# Patient Record
Sex: FEMALE | URBAN - METROPOLITAN AREA
[De-identification: names, ages, dates, MRNs, and addresses within clinical notes are randomized per-mention and may not be internally consistent; named-entity substitution may affect disease eponyms.]

---

## 2018-06-22 ENCOUNTER — APPOINTMENT (RX ONLY)
Dept: URBAN - METROPOLITAN AREA CLINIC 156 | Facility: CLINIC | Age: 73
Setting detail: DERMATOLOGY
End: 2018-06-22

## 2018-06-22 DIAGNOSIS — L57.0 ACTINIC KERATOSIS: ICD-10-CM

## 2018-06-22 DIAGNOSIS — L81.4 OTHER MELANIN HYPERPIGMENTATION: ICD-10-CM

## 2018-06-22 DIAGNOSIS — L71.8 OTHER ROSACEA: ICD-10-CM

## 2018-06-22 DIAGNOSIS — Z71.89 OTHER SPECIFIED COUNSELING: ICD-10-CM

## 2018-06-22 DIAGNOSIS — D18.0 HEMANGIOMA: ICD-10-CM

## 2018-06-22 DIAGNOSIS — L82.1 OTHER SEBORRHEIC KERATOSIS: ICD-10-CM

## 2018-06-22 DIAGNOSIS — Z85.828 PERSONAL HISTORY OF OTHER MALIGNANT NEOPLASM OF SKIN: ICD-10-CM

## 2018-06-22 PROBLEM — D18.01 HEMANGIOMA OF SKIN AND SUBCUTANEOUS TISSUE: Status: ACTIVE | Noted: 2018-06-22

## 2018-06-22 PROCEDURE — 17000 DESTRUCT PREMALG LESION: CPT

## 2018-06-22 PROCEDURE — ? RECOMMENDATIONS

## 2018-06-22 PROCEDURE — ? COUNSELING

## 2018-06-22 PROCEDURE — ? LIQUID NITROGEN

## 2018-06-22 PROCEDURE — ? SUNSCREEN RECOMMENDATIONS

## 2018-06-22 PROCEDURE — 99202 OFFICE O/P NEW SF 15 MIN: CPT | Mod: 25

## 2018-06-22 ASSESSMENT — LOCATION SIMPLE DESCRIPTION DERM
LOCATION SIMPLE: LEFT FOREARM
LOCATION SIMPLE: LEFT CHEEK
LOCATION SIMPLE: RIGHT CHEEK
LOCATION SIMPLE: LEFT UPPER BACK
LOCATION SIMPLE: RIGHT FOREARM
LOCATION SIMPLE: NOSE

## 2018-06-22 ASSESSMENT — LOCATION DETAILED DESCRIPTION DERM
LOCATION DETAILED: RIGHT VENTRAL DISTAL FOREARM
LOCATION DETAILED: LEFT SUPERIOR MEDIAL UPPER BACK
LOCATION DETAILED: RIGHT INFERIOR LATERAL MALAR CHEEK
LOCATION DETAILED: LEFT INFERIOR CENTRAL MALAR CHEEK
LOCATION DETAILED: RIGHT INFERIOR CENTRAL MALAR CHEEK
LOCATION DETAILED: LEFT NASAL DORSUM
LOCATION DETAILED: LEFT VENTRAL DISTAL FOREARM

## 2018-06-22 ASSESSMENT — LOCATION ZONE DERM
LOCATION ZONE: ARM
LOCATION ZONE: FACE
LOCATION ZONE: NOSE
LOCATION ZONE: TRUNK

## 2018-06-22 NOTE — PROCEDURE: RECOMMENDATIONS
Recommendation Preamble: The following recommendations were made during the visit:
Recommendations (Free Text): Discussed IPL will precert
Detail Level: Zone

## 2018-06-27 ENCOUNTER — APPOINTMENT (RX ONLY)
Dept: URBAN - METROPOLITAN AREA CLINIC 154 | Facility: CLINIC | Age: 73
Setting detail: DERMATOLOGY
End: 2018-06-27

## 2018-06-27 DIAGNOSIS — L71.8 OTHER ROSACEA: ICD-10-CM

## 2018-06-27 PROCEDURE — 17107 DSTR CUT VSC PRLF LES10-50SQ: CPT | Mod: 79

## 2018-06-27 PROCEDURE — ? LASER VASCULAR LESION

## 2018-06-27 ASSESSMENT — LOCATION SIMPLE DESCRIPTION DERM
LOCATION SIMPLE: RIGHT CHEEK
LOCATION SIMPLE: LEFT CHEEK

## 2018-06-27 ASSESSMENT — LOCATION DETAILED DESCRIPTION DERM
LOCATION DETAILED: LEFT CENTRAL MALAR CHEEK
LOCATION DETAILED: RIGHT CENTRAL MALAR CHEEK

## 2018-06-27 ASSESSMENT — LOCATION ZONE DERM: LOCATION ZONE: FACE

## 2018-06-27 NOTE — PROCEDURE: LASER VASCULAR LESION
Was Feathering Performed?: Yes
Wavelength: 10,600nm
Pulse Duration (Usec): 0
Consent: Written consent obtained, risks reviewed including but not limited to crusting, scabbing, blistering, scarring, darker or lighter pigmentary change, incidental hair removal, bruising, and/or incomplete removal.
Total Pulses: 36
Immediate Endpoint: erythema
Post Procedure Text: Cooled triamcinolone and ice applied. Post care reviewed with patient.
Laser Mode: Fractionated
Delay Time (Ms): 20
Medical Necessity Information: It is in your best interest to select a reason for this procedure from the list below. All of these items fulfill various CMS LCD requirements except the new and changing color options.
Power (Wisdom-Leave At Zero If Unwanted): 40
Include Z78.9 (Other Specified Conditions Influencing Health Status) As An Associated Diagnosis?: No
Spot Sizes: 3mm
Laser Type: Margie 50 CO2 Laser
Post-Care Instructions: I reviewed with the patient in detail post-care instructions. Patient should stay away from the sun and wear sun protection until treated areas are fully healed.
Fluence: 10
Spot Size: 15 mm X 35 mm
Feathering Statement: Following the treatment the wound edges were feathered using 260mJ.
Passes: several
Cryogen Time (Ms): 30
Energy(Mj-Leave At Zero If Unwanted): 500
Pulse Duration: 20 ms
Laser Type: Serg Icon Laser
Detail Level: Zone
Medical Necessity Clause: This procedure was medically necessary because the lesions that were treated were:
Square Area Of Lesion: 20

## 2018-07-11 ENCOUNTER — APPOINTMENT (RX ONLY)
Dept: URBAN - METROPOLITAN AREA CLINIC 154 | Facility: CLINIC | Age: 73
Setting detail: DERMATOLOGY
End: 2018-07-11

## 2018-07-11 DIAGNOSIS — Z41.9 ENCOUNTER FOR PROCEDURE FOR PURPOSES OTHER THAN REMEDYING HEALTH STATE, UNSPECIFIED: ICD-10-CM

## 2018-07-11 PROCEDURE — ? MICRONEEDLING

## 2018-07-11 NOTE — PROCEDURE: MICRONEEDLING
Detail Level: Zone
Location #1: full face
Depth In Mm: 1.5
Treatment Number (Optional): 1
Topical Anesthesia?: 23% lidocaine, 7% tetracaine
Length Of Topical Anesthesia Application (Optional): 20 minutes
Infusions (Optional): PRP
Depth In Mm: 0.1
Consent: Written consent obtained, risks reviewed including but not limited to pain, scarring, infection and incomplete improvement.  Patient understands the procedure is cosmetic in nature and will require out of pocket payment.
Post-Care Instructions: After the procedure, take precautions agains sun exposure. Do not apply sunscreen for 12 hours after the procedure. Do not apply make-up for 12 hours after the procedure. Avoid alcohol based toners for 10-14 days. After 2-3 days patients can return to their regular skin regimen.

## 2018-08-08 ENCOUNTER — APPOINTMENT (RX ONLY)
Dept: URBAN - METROPOLITAN AREA CLINIC 154 | Facility: CLINIC | Age: 73
Setting detail: DERMATOLOGY
End: 2018-08-08

## 2018-08-08 DIAGNOSIS — Z41.9 ENCOUNTER FOR PROCEDURE FOR PURPOSES OTHER THAN REMEDYING HEALTH STATE, UNSPECIFIED: ICD-10-CM

## 2018-08-08 PROCEDURE — ? MICRONEEDLING

## 2018-08-08 NOTE — PROCEDURE: MICRONEEDLING
Length Of Topical Anesthesia Application (Optional): 30 minutes
Depth In Mm: 0.1
Treatment Number (Optional): 1
Depth In Mm: 1.5
Detail Level: Zone
Infusions (Optional): PRP
Location #1: full face, neck
Consent: Written consent obtained, risks reviewed including but not limited to pain, scarring, infection and incomplete improvement.  Patient understands the procedure is cosmetic in nature and will require out of pocket payment.
Topical Anesthesia?: 23% lidocaine, 7% tetracaine
Post-Care Instructions: After the procedure, take precautions agains sun exposure. Do not apply sunscreen for 12 hours after the procedure. Do not apply make-up for 12 hours after the procedure. Avoid alcohol based toners for 10-14 days. After 2-3 days patients can return to their regular skin regimen.